# Patient Record
Sex: FEMALE | Race: WHITE | NOT HISPANIC OR LATINO
[De-identification: names, ages, dates, MRNs, and addresses within clinical notes are randomized per-mention and may not be internally consistent; named-entity substitution may affect disease eponyms.]

---

## 2019-03-30 ENCOUNTER — TRANSCRIPTION ENCOUNTER (OUTPATIENT)
Age: 44
End: 2019-03-30

## 2019-06-17 ENCOUNTER — TRANSCRIPTION ENCOUNTER (OUTPATIENT)
Age: 44
End: 2019-06-17

## 2022-09-02 ENCOUNTER — APPOINTMENT (OUTPATIENT)
Dept: ORTHOPEDIC SURGERY | Facility: CLINIC | Age: 47
End: 2022-09-02

## 2022-09-02 VITALS — WEIGHT: 155 LBS | BODY MASS INDEX: 30.43 KG/M2 | HEIGHT: 60 IN

## 2022-09-02 DIAGNOSIS — I10 ESSENTIAL (PRIMARY) HYPERTENSION: ICD-10-CM

## 2022-09-02 PROBLEM — Z00.00 ENCOUNTER FOR PREVENTIVE HEALTH EXAMINATION: Status: ACTIVE | Noted: 2022-09-02

## 2022-09-02 PROCEDURE — L1833: CPT

## 2022-09-02 PROCEDURE — 73562 X-RAY EXAM OF KNEE 3: CPT | Mod: RT

## 2022-09-02 PROCEDURE — J3490M: CUSTOM

## 2022-09-02 PROCEDURE — 99203 OFFICE O/P NEW LOW 30 MIN: CPT | Mod: 25

## 2022-09-02 PROCEDURE — 20610 DRAIN/INJ JOINT/BURSA W/O US: CPT | Mod: RT

## 2022-09-02 RX ORDER — VALSARTAN 40 MG/1
TABLET, COATED ORAL
Refills: 0 | Status: ACTIVE | COMMUNITY

## 2022-09-02 NOTE — ASSESSMENT
[FreeTextEntry1] : Xrays reviewed with patient\par Treatment options discussed \par HKB medically necessary for stability\par Injection done today, tolerated well\par Follow up with Dr. Jones in 2 weeks for further treatment \par \par Large joint corticosteroid injection given: Right KNee\par \par Patient indicated for injection after trial of rest, OTC medications including aspirin, Ibuprofen, Aleve etc or prescription NSAIDS, and/or exercises at home and/ or physical therapy without satisfactory response.  Patient has symptoms including pain, swelling, and/or decreased mobility in the joint. The risks, benefits, and alternatives to corticosteroid injection were explained in full to the patient, including but not limited to infection, sepsis, bleeding, scarring, skin discoloration, temporary increase in pain, syncopal episode, failure to resolve symptoms, allergic reaction, symptom recurrence, and elevation of blood sugar in diabetics. Patient understood the risks. All questions were answered. After discussion of options, patient requested an injection. \par \par Oral informed consent was obtained and sterile technique was utilized for the procedure including the preparation of the solutions used for the injection and betadine followed by alcohol prep to the injection site. Anesthesia was given with ethyl chloride sprayed topically. The injection was delivered. Patient tolerated the procedure well. \par \par Post Procedure Instructions: Patient was advised to call if redness, pain, or fever occur and apply ice for 15 min on and 15 min off later today\par \par Medications delivered: 2 cc celestone, 3 cc lidocaine, 3 cc marcaine

## 2022-09-02 NOTE — PHYSICAL EXAM
[NL (0)] : extension 0 degrees [5___] : hamstring 5[unfilled]/5 [Equivocal] : equivocal Mitchel [] : patient ambulates without assistive device [Right] : right knee [Degenerative change] : Degenerative change [TWNoteComboBox7] : flexion 90 degrees

## 2022-09-02 NOTE — HISTORY OF PRESENT ILLNESS
[8] : 8 [Radiating] : radiating [de-identified] : 9/2/22: Patient is a 46 yo female c/o right knee pain for 1 day after being on her feet all day at work. States her knee buckled on her and her pain increased. Taking motrin as needed for pain. Known hx of OA, [facility]. No previous surgeries to right knee.  [FreeTextEntry5] : pt c.o pain in rt knee  for 2 days states prior hx of arthritis\par

## 2022-09-04 ENCOUNTER — TRANSCRIPTION ENCOUNTER (OUTPATIENT)
Age: 47
End: 2022-09-04

## 2022-10-10 ENCOUNTER — APPOINTMENT (OUTPATIENT)
Dept: ORTHOPEDIC SURGERY | Facility: CLINIC | Age: 47
End: 2022-10-10

## 2022-10-10 VITALS — HEIGHT: 60 IN | BODY MASS INDEX: 30.43 KG/M2 | WEIGHT: 155 LBS

## 2022-10-10 PROCEDURE — 99204 OFFICE O/P NEW MOD 45 MIN: CPT

## 2022-10-10 NOTE — HISTORY OF PRESENT ILLNESS
[de-identified] : The patient is a 47 year old R hand dominant female who presents today c/o R knee pain. \par Date of Injury/Onset: ~4 weeks\par Pain:    At Rest: 5/10 \par With Activity:  8/10 \par Mechanism of injury: Insidious\par This is NOT a Work Related Injury being treated under Worker's Compensation.\par This is NOT an athletic injury occurring associated with an interscholastic or organized sports team.\par Quality of symptoms: Intense achiness, stiffness\par Improves with: CSI \par Worse with: Prolonged walking/standing/sitting \par Prior treatment: [facility]; CSI \par Prior Imaging: X-ray \par Out of work/sport: _, since _\par School/Sport/Position/Occupation: Teacher \par Additional Information: None\par

## 2022-10-10 NOTE — PHYSICAL EXAM
[Right] : right knee [All Views] : anteroposterior, lateral, skyline, and anteroposterior standing [There are no fractures, subluxations or dislocations. No significant abnormalities are seen] : There are no fractures, subluxations or dislocations. No significant abnormalities are seen [de-identified] : Neurologic: normal coordination, normal sensation, normal mood and affect, orientated and able to communicate.\par \par Skin: normal skin, no rash, no ulcers and no lesions.\par \par Lymphatic: no obvious lymphadenopathy in areas examined.\par \par Constitutional: well developed and well nourished.\par \par Cardiovascular: peripheral vascular exam is grossly normal.\par \par Pulmonary: no respiratory distress, lungs clear to auscultation bilaterally.\par \par Abdomen: normal bowel sounds, non-tender, no HSM and no mass.\par \par Examination of the right knee is as follows: \par Inspection: no effusion, no ecchymosis, no erythema, no atrophy, no deformities of quad tendon, no deformities of patellar tendon\par Palpation: medial joint line tenderness, ROM: full flexion and extension without pain 0-140. knee locking. Strength: quadriceps 5/5, hamstring 5/5. \par Testing: positive Chuck's \par Neuro: light touch is intact throughout.\par  [FreeTextEntry9] : 9/2/22 xrays right knee: PFOA.

## 2022-10-10 NOTE — DISCUSSION/SUMMARY
[de-identified] : The patient has tried  physical therapy, anti-inflammatories, rest, RICE, all with no relief. Let this note serve as a letter of medical necessity for MRI. They will have a right knee MRI to evaluate for MMT. They will follow up with me after test.\par \par OA packet provided. \par \par The patient has tried physical therapy, anti-inflammatories, rest, all with no relief. Let this note serve as a letter of medical necessity for authorization of hyaluronic acid injections. (Euflexxa)\par \par "Written by Storm Gunderson, acting as Scribe for Robb Jones M.D" \par \par Home Exercise \par \par  The patient is instructed on a home exercise program. \par  \par RICE \par I explained to the patient that rest, ice, compression, and elevation would benefit them.  They may return to activity after follow-up or when they no longer have any pain. \par  \par Pain Guide Activities \par The patient was advised to let pain guide the gradual advancement of activities. \par  \par Activity Modification \par The patient was advised to modify their activities. \par  \par Dx / Natural History \par The patient was advised of the diagnosis.  The natural history of the pathology was explained in full to the patient in layman's terms.  Several different treatment options were discussed and explained in full to the patient including the risks and benefits of both surgical and non-surgical treatments.  All questions and concerns were answered.\par

## 2022-10-18 ENCOUNTER — APPOINTMENT (OUTPATIENT)
Dept: ORTHOPEDIC SURGERY | Facility: CLINIC | Age: 47
End: 2022-10-18

## 2022-10-18 PROCEDURE — 20611 DRAIN/INJ JOINT/BURSA W/US: CPT

## 2022-10-18 PROCEDURE — 99214 OFFICE O/P EST MOD 30 MIN: CPT | Mod: 25

## 2022-10-18 NOTE — HISTORY OF PRESENT ILLNESS
[de-identified] : The patient is a 47 year old R hand dominant female who presents today c/o R knee pain. \par Date of Injury/Onset: ~4 weeks\par Pain: At Rest: 5/10 \par With Activity: 8/10 \par Mechanism of injury: Insidious\par This is NOT a Work Related Injury being treated under Worker's Compensation.\par This is NOT an athletic injury occurring associated with an interscholastic or organized sports team.\par Quality of symptoms: Intense achiness, stiffness\par Improves with: CSI \par Worse with: Prolonged walking/standing/sitting \par Prior treatment: [facility]; CSI \par Prior Imaging: X-ray \par Out of work/sport: _, since _\par School/Sport/Position/Occupation: Teacher \par Additional Information: Starting her series for injections today\par  \par

## 2022-10-18 NOTE — PHYSICAL EXAM
[Right] : right knee [All Views] : anteroposterior, lateral, skyline, and anteroposterior standing [There are no fractures, subluxations or dislocations. No significant abnormalities are seen] : There are no fractures, subluxations or dislocations. No significant abnormalities are seen [de-identified] : Neurologic: normal coordination, normal sensation, normal mood and affect, orientated and able to communicate.\par \par Skin: normal skin, no rash, no ulcers and no lesions.\par \par Lymphatic: no obvious lymphadenopathy in areas examined.\par \par Constitutional: well developed and well nourished.\par \par Cardiovascular: peripheral vascular exam is grossly normal.\par \par Pulmonary: no respiratory distress, lungs clear to auscultation bilaterally.\par \par Abdomen: normal bowel sounds, non-tender, no HSM and no mass.\par \par Examination of the right knee is as follows: \par Inspection: no effusion, no ecchymosis, no erythema, no atrophy, no deformities of quad tendon, no deformities of patellar tendon\par Palpation: medial joint line tenderness, ROM: full flexion and extension without pain 0-140. knee locking. Strength: quadriceps 5/5, hamstring 5/5. \par Testing: positive Chuck's \par Neuro: light touch is intact throughout.\par  [FreeTextEntry9] : 9/2/22 xrays right knee: PFOA.

## 2022-10-18 NOTE — DISCUSSION/SUMMARY
[de-identified] : RB&A to hyaluronic acid injection discussed.\par All questions were answered.\par Patient wishes to move forward with injection today.\par Fu 1 week Euflexxa #2 rt knee \par \par RIGHT KNEE EUFLEXXA - Ultrasound Guided #1 of 3\par Patient Identification\par Name/: Verbal with patient and/or family\par Procedure Verification:\par Procedure confirmed with patient or family/designee\par Consent for procedure: Verbal Consent Given\par Relevant documentation completed, reviewed, and signed\par Clinical indications for procedure confirmed\par Time-out with all members of procedure team immediately prior to procedure:\par Correct patient identified. Agreement on procedure. Correct side and site.\par \par KNEE INTRAARTICULAR INJECTION - RIGHT\par After verbal consent and identification of the correct patient and correct site, the RIGHT knee were prepped using alcohol swabs and betadine. This was allowed time to air dry. The pre-loaded Euflexxa was injected into the RIGHT knee via the lateral knee portal with a 1 inch 22G needle. Ultrasound was used to ensure proper needle placement. The patient tolerated the procedure well. A sterile dressing was placed. After-care instructions were provided and included instructions to ice the area and to call if redness, pain, or fever develop.

## 2022-10-21 ENCOUNTER — FORM ENCOUNTER (OUTPATIENT)
Age: 47
End: 2022-10-21

## 2022-10-22 ENCOUNTER — APPOINTMENT (OUTPATIENT)
Dept: MRI IMAGING | Facility: CLINIC | Age: 47
End: 2022-10-22

## 2022-10-22 PROCEDURE — 73721 MRI JNT OF LWR EXTRE W/O DYE: CPT | Mod: RT

## 2022-10-25 ENCOUNTER — APPOINTMENT (OUTPATIENT)
Dept: ORTHOPEDIC SURGERY | Facility: CLINIC | Age: 47
End: 2022-10-25

## 2022-10-25 PROCEDURE — 99213 OFFICE O/P EST LOW 20 MIN: CPT | Mod: 25

## 2022-10-25 PROCEDURE — 20611 DRAIN/INJ JOINT/BURSA W/US: CPT

## 2022-10-25 NOTE — HISTORY OF PRESENT ILLNESS
[de-identified] : The patient is a 47 year old R hand dominant female who presents today c/o R knee pain. \par Date of Injury/Onset: ~4 weeks\par Pain: At Rest: 5/10 \par With Activity: 8/10 \par Mechanism of injury: Insidious\par This is NOT a Work Related Injury being treated under Worker's Compensation.\par This is NOT an athletic injury occurring associated with an interscholastic or organized sports team.\par Quality of symptoms: Intense achiness, stiffness\par Improves with: CSI \par Worse with: Prolonged walking/standing/sitting \par Prior treatment: [facility]; CSI \par Prior Imaging: X-ray \par Out of work/sport: _, since _\par School/Sport/Position/Occupation: Teacher \par Additional Information: Euflexxa series continued today \par  \par

## 2022-10-25 NOTE — DISCUSSION/SUMMARY
[de-identified] : RB&A to hyaluronic acid injection discussed.\par All questions were answered.\par Patient wishes to move forward with injection today.\par Fu 1 week Euflexxa #3 rt knee \par \par RIGHT KNEE EUFLEXXA - Ultrasound Guided #2 of 3\par Patient Identification\par Name/: Verbal with patient and/or family\par Procedure Verification:\par Procedure confirmed with patient or family/designee\par Consent for procedure: Verbal Consent Given\par Relevant documentation completed, reviewed, and signed\par Clinical indications for procedure confirmed\par Time-out with all members of procedure team immediately prior to procedure:\par Correct patient identified. Agreement on procedure. Correct side and site.\par \par KNEE INTRAARTICULAR INJECTION - RIGHT\par After verbal consent and identification of the correct patient and correct site, the RIGHT knee were prepped using alcohol swabs and betadine. This was allowed time to air dry. The pre-loaded Euflexxa was injected into the RIGHT knee via the lateral knee portal with a 1 inch 22G needle. Ultrasound was used to ensure proper needle placement. The patient tolerated the procedure well. A sterile dressing was placed. After-care instructions were provided and included instructions to ice the area and to call if redness, pain, or fever develop.

## 2022-10-25 NOTE — PHYSICAL EXAM
[Right] : right knee [All Views] : anteroposterior, lateral, skyline, and anteroposterior standing [There are no fractures, subluxations or dislocations. No significant abnormalities are seen] : There are no fractures, subluxations or dislocations. No significant abnormalities are seen [de-identified] : Neurologic: normal coordination, normal sensation, normal mood and affect, orientated and able to communicate.\par \par Skin: normal skin, no rash, no ulcers and no lesions.\par \par Lymphatic: no obvious lymphadenopathy in areas examined.\par \par Constitutional: well developed and well nourished.\par \par Cardiovascular: peripheral vascular exam is grossly normal.\par \par Pulmonary: no respiratory distress, lungs clear to auscultation bilaterally.\par \par Abdomen: normal bowel sounds, non-tender, no HSM and no mass.\par \par Examination of the right knee is as follows: \par Inspection: no effusion, no ecchymosis, no erythema, no atrophy, no deformities of quad tendon, no deformities of patellar tendon\par Palpation: medial joint line tenderness, ROM: full flexion and extension without pain 0-140. knee locking. Strength: quadriceps 5/5, hamstring 5/5. \par Testing: positive Chuck's \par Neuro: light touch is intact throughout.\par  [FreeTextEntry9] : 9/2/22 xrays right knee: PFOA.

## 2022-10-28 ENCOUNTER — APPOINTMENT (OUTPATIENT)
Dept: ORTHOPEDIC SURGERY | Facility: CLINIC | Age: 47
End: 2022-10-28

## 2022-11-01 ENCOUNTER — APPOINTMENT (OUTPATIENT)
Dept: ORTHOPEDIC SURGERY | Facility: CLINIC | Age: 47
End: 2022-11-01

## 2022-11-01 VITALS — BODY MASS INDEX: 30.43 KG/M2 | WEIGHT: 155 LBS | HEIGHT: 60 IN

## 2022-11-01 PROCEDURE — 20611 DRAIN/INJ JOINT/BURSA W/US: CPT

## 2022-11-01 PROCEDURE — 99213 OFFICE O/P EST LOW 20 MIN: CPT | Mod: 25

## 2022-11-01 NOTE — PHYSICAL EXAM
[Right] : right knee [All Views] : anteroposterior, lateral, skyline, and anteroposterior standing [There are no fractures, subluxations or dislocations. No significant abnormalities are seen] : There are no fractures, subluxations or dislocations. No significant abnormalities are seen [de-identified] : Neurologic: normal coordination, normal sensation, normal mood and affect, orientated and able to communicate.\par \par Skin: normal skin, no rash, no ulcers and no lesions.\par \par Lymphatic: no obvious lymphadenopathy in areas examined.\par \par Constitutional: well developed and well nourished.\par \par Cardiovascular: peripheral vascular exam is grossly normal.\par \par Pulmonary: no respiratory distress, lungs clear to auscultation bilaterally.\par \par Abdomen: normal bowel sounds, non-tender, no HSM and no mass.\par \par Examination of the right knee is as follows: \par Inspection: no effusion, no ecchymosis, no erythema, no atrophy, no deformities of quad tendon, no deformities of patellar tendon\par Palpation: medial joint line tenderness, ROM: full flexion and extension without pain 0-140. knee locking. Strength: quadriceps 5/5, hamstring 5/5. \par Testing: positive Chuck's \par Neuro: light touch is intact throughout.\par  [FreeTextEntry9] : 9/2/22 xrays right knee: PFOA.

## 2022-11-01 NOTE — DISCUSSION/SUMMARY
[de-identified] : RB&A to hyaluronic acid injection discussed.\par All questions were answered.\par Patient wishes to move forward with injection today.\par Follow up as needed\par \par RIGHT KNEE EUFLEXXA - Ultrasound Guided #3 of 3\par Patient Identification\par Name/: Verbal with patient and/or family\par Procedure Verification:\par Procedure confirmed with patient or family/designee\par Consent for procedure: Verbal Consent Given\par Relevant documentation completed, reviewed, and signed\par Clinical indications for procedure confirmed\par Time-out with all members of procedure team immediately prior to procedure:\par Correct patient identified. Agreement on procedure. Correct side and site.\par \par KNEE INTRAARTICULAR INJECTION - RIGHT - ULTRASOUND GUIDANCE \par After verbal consent and identification of the correct patient and correct site, the RIGHT knee were prepped using alcohol swabs and betadine. This was allowed time to air dry. The pre-loaded Euflexxa was injected into the RIGHT knee via the lateral knee portal with a 1 inch 22G needle. Ultrasound was used to ensure proper needle placement. The patient tolerated the procedure well. A sterile dressing was placed. After-care instructions were provided and included instructions to ice the area and to call if redness, pain, or fever develop.

## 2022-11-01 NOTE — HISTORY OF PRESENT ILLNESS
[de-identified] : The patient is a 47 year old R hand dominant female who presents today c/o R knee pain. \par Date of Injury/Onset:\par Pain: At Rest: 5/10 \par With Activity: 8/10 \par Mechanism of injury: Insidious\par This is NOT a Work Related Injury being treated under Worker's Compensation.\par This is NOT an athletic injury occurring associated with an interscholastic or organized sports team.\par Quality of symptoms: Intense achiness, stiffness\par Improves with: CSI \par Worse with: Prolonged walking/standing/sitting \par Prior treatment: [facility]; CSI \par Prior Imaging: X-ray \par Out of work/sport: _, since _\par School/Sport/Position/Occupation: Teacher \par Additional Information: Euflexxa series continued today \par

## 2023-01-21 ENCOUNTER — APPOINTMENT (OUTPATIENT)
Dept: ORTHOPEDIC SURGERY | Facility: CLINIC | Age: 48
End: 2023-01-21
Payer: COMMERCIAL

## 2023-01-21 VITALS — WEIGHT: 155 LBS | HEIGHT: 60 IN | BODY MASS INDEX: 30.43 KG/M2

## 2023-01-21 PROCEDURE — 73564 X-RAY EXAM KNEE 4 OR MORE: CPT | Mod: LT

## 2023-02-14 ENCOUNTER — APPOINTMENT (OUTPATIENT)
Dept: ORTHOPEDIC SURGERY | Facility: CLINIC | Age: 48
End: 2023-02-14

## 2023-03-21 ENCOUNTER — APPOINTMENT (OUTPATIENT)
Dept: ORTHOPEDIC SURGERY | Facility: CLINIC | Age: 48
End: 2023-03-21
Payer: COMMERCIAL

## 2023-03-21 VITALS — WEIGHT: 155 LBS | HEIGHT: 60 IN | BODY MASS INDEX: 30.43 KG/M2

## 2023-03-21 DIAGNOSIS — Z78.9 OTHER SPECIFIED HEALTH STATUS: ICD-10-CM

## 2023-03-21 PROCEDURE — 99214 OFFICE O/P EST MOD 30 MIN: CPT

## 2023-03-21 NOTE — DATA REVIEWED
[FreeTextEntry1] : 01/21/23\par X-ray of the LEFT KNEE - 4 views - [facility]\par PFOA \par mild medial OA\par

## 2023-03-21 NOTE — HISTORY OF PRESENT ILLNESS
[de-identified] : The patient is a 48 year old R hand dominant F who presents today complaining of Left knee.   \par Date of Injury/Onset: December 2022\par Pain:    At Rest: 4/10  \par With Activity:  7/10  \par Mechanism of injury: No specific injury, hx of OA\par This is not a Work Related Injury being treated under Worker's Compensation. \par This is not an athletic injury occurring associated with an interscholastic or organized sports team. \par Quality of symptoms: stiffness, aching\par Improves with: ice, elevation, NSAIDS, knee brace\par Worse with: activity\par Prior treatment: CSI 1/21/23, sxs came back after 6 weeks \par Prior Imaging: XR at [facility] \par Out of work/sport: _, since _ \par School/Sport/Position/Occupation:full time teacher\par Additional Information: has done CSI and euflexxa injections on the opposite knee which helped sxs.\par has hx of OA and meniscus repair on the left knee.  \par

## 2023-03-21 NOTE — PHYSICAL EXAM
[Right] : right knee [All Views] : anteroposterior, lateral, skyline, and anteroposterior standing [There are no fractures, subluxations or dislocations. No significant abnormalities are seen] : There are no fractures, subluxations or dislocations. No significant abnormalities are seen [de-identified] : Left Knee:\par Full ROM \par crepitus \par nontender\par  [FreeTextEntry9] : 9/2/22 xrays right knee: PFOA.

## 2023-03-21 NOTE — DISCUSSION/SUMMARY
[de-identified] : RB&A to hyaluronic acid injection discussed.\par All questions were answered.\par \par Patient has tried physical therapy, anti-inflammatories, rest, with no improvement. Let this note serve as a letter of medical necessity for authorization of hyaluronic acid visco injection(s). \par \par -----------------------------------------------\par Home Exercise\par The patient is instructed on a home exercise program.\par \par YAS ABRAHAM Acting as a Scribe for Dr. Jones\par I, Yas Abraham, attest that this documentation has been prepared under the direction and in the presence of Provider Robb Jones MD.\par \par Activity Modification\par The patient was advised to modify their activities.\par \par Dx / Natural History\par The patient was advised of the diagnosis.  The natural history of the pathology was explained in full to the patient in layman's terms.  Several different treatment options were discussed and explained in full to the patient including the risks and benefits of both surgical and non-surgical treatments.  All questions and concerns were answered.\par \par Pain Guide Activities\par The patient was advised to let pain guide the gradual advancement of activities.\par \par RICE\par I explained to the patient that rest, ice, compression, and elevation would benefit them.  They may return to activity after follow-up or when they no longer have any pain. \par

## 2023-03-31 ENCOUNTER — APPOINTMENT (OUTPATIENT)
Dept: ORTHOPEDIC SURGERY | Facility: CLINIC | Age: 48
End: 2023-03-31
Payer: COMMERCIAL

## 2023-03-31 VITALS — BODY MASS INDEX: 30.43 KG/M2 | HEIGHT: 60 IN | WEIGHT: 155 LBS

## 2023-03-31 PROCEDURE — 20611 DRAIN/INJ JOINT/BURSA W/US: CPT

## 2023-03-31 PROCEDURE — 99214 OFFICE O/P EST MOD 30 MIN: CPT | Mod: 25

## 2023-03-31 NOTE — PHYSICAL EXAM
[Right] : right knee [All Views] : anteroposterior, lateral, skyline, and anteroposterior standing [There are no fractures, subluxations or dislocations. No significant abnormalities are seen] : There are no fractures, subluxations or dislocations. No significant abnormalities are seen [de-identified] : Left Knee:\par Full ROM \par crepitus \par nontender\par  [FreeTextEntry9] : 9/2/22 xrays right knee: PFOA.

## 2023-03-31 NOTE — DISCUSSION/SUMMARY
[de-identified] : RB&A to hyaluronic acid injection discussed.\par All questions were answered.\par Euflexxa #1 of 3 injection series in the left knee was provided today.\par Follow up one week to receive #2 of 3 injection series.\par \par LEFT KNEE EUFLEXXA INJECTION - ULTRASOUND GUIDED \par Patient Identification\par Name/: Verbal with patient and/or family\par Procedure Verification:\par Procedure confirmed with patient or family/designee\par Consent for procedure: Verbal Consent Given\par Relevant documentation completed, reviewed, and signed\par Clinical indications for procedure confirmed\par Time-out with all members of procedure team immediately prior to procedure:\par Correct patient identified. Agreement on procedure. Correct side and site.\par \par KNEE INTRAARTICULAR INJECTION - LEFT \par After verbal consent and identification of the correct patient and correct site, the LEFT knee were prepped using alcohol swabs and betadine. This was allowed time to air dry. The pre-loaded EUFLEXXA was injected into the LEFT knee via the lateral knee portal with a 1 inch 22G needle. Ultrasound was used to ensure proper needle placement. The patient tolerated the procedure well. A sterile dressing was placed. After-care instructions were provided and included instructions to ice the area and to call if redness, pain, or fever develop. \par \par -----------------------------------------------\par Home Exercise\par The patient is instructed on a home exercise program.\par \par YAS ABRAHAM Acting as a Scribe for Dr. Jones\par I, Yas Abraham, attest that this documentation has been prepared under the direction and in the presence of Provider Robb Jones MD.\par \par Activity Modification\par The patient was advised to modify their activities.\par \par Dx / Natural History\par The patient was advised of the diagnosis.  The natural history of the pathology was explained in full to the patient in layman's terms.  Several different treatment options were discussed and explained in full to the patient including the risks and benefits of both surgical and non-surgical treatments.  All questions and concerns were answered.\par \par Pain Guide Activities\par The patient was advised to let pain guide the gradual advancement of activities.\par \par RICE\par I explained to the patient that rest, ice, compression, and elevation would benefit them.  They may return to activity after follow-up or when they no longer have any pain. \par

## 2023-04-11 ENCOUNTER — APPOINTMENT (OUTPATIENT)
Dept: ORTHOPEDIC SURGERY | Facility: CLINIC | Age: 48
End: 2023-04-11
Payer: COMMERCIAL

## 2023-04-11 VITALS — WEIGHT: 155 LBS | BODY MASS INDEX: 30.43 KG/M2 | HEIGHT: 60 IN

## 2023-04-11 PROCEDURE — 20611 DRAIN/INJ JOINT/BURSA W/US: CPT

## 2023-04-11 PROCEDURE — 99213 OFFICE O/P EST LOW 20 MIN: CPT | Mod: 25

## 2023-04-11 NOTE — PHYSICAL EXAM
[Right] : right knee [All Views] : anteroposterior, lateral, skyline, and anteroposterior standing [There are no fractures, subluxations or dislocations. No significant abnormalities are seen] : There are no fractures, subluxations or dislocations. No significant abnormalities are seen [de-identified] : Left Knee:\par Full ROM \par crepitus \par nontender\par  [FreeTextEntry9] : 9/2/22 xrays right knee: PFOA.

## 2023-04-11 NOTE — DISCUSSION/SUMMARY
[de-identified] : RB&A to hyaluronic acid injection discussed.\par All questions were answered.\par Euflexxa #2 of 3 injection series in the left knee was provided today.\par Follow up one week to receive #3 of 3 injection series.\par \par LEFT KNEE EUFLEXXA INJECTION - ULTRASOUND GUIDED \par Patient Identification\par Name/: Verbal with patient and/or family\par Procedure Verification:\par Procedure confirmed with patient or family/designee\par Consent for procedure: Verbal Consent Given\par Relevant documentation completed, reviewed, and signed\par Clinical indications for procedure confirmed\par Time-out with all members of procedure team immediately prior to procedure:\par Correct patient identified. Agreement on procedure. Correct side and site.\par \par KNEE INTRAARTICULAR INJECTION - LEFT \par After verbal consent and identification of the correct patient and correct site, the LEFT knee were prepped using alcohol swabs and betadine. This was allowed time to air dry. The pre-loaded EUFLEXXA was injected into the LEFT knee via the lateral knee portal with a 1 inch 22G needle. Ultrasound was used to ensure proper needle placement. The patient tolerated the procedure well. A sterile dressing was placed. After-care instructions were provided and included instructions to ice the area and to call if redness, pain, or fever develop. \par \par -----------------------------------------------\par Home Exercise\par The patient is instructed on a home exercise program.\par \par YAS ABRAHAM Acting as a Scribe for Dr. Jones\par I, Yas Abraham, attest that this documentation has been prepared under the direction and in the presence of Provider Robb Jones MD.\par \par Activity Modification\par The patient was advised to modify their activities.\par \par Dx / Natural History\par The patient was advised of the diagnosis.  The natural history of the pathology was explained in full to the patient in layman's terms.  Several different treatment options were discussed and explained in full to the patient including the risks and benefits of both surgical and non-surgical treatments.  All questions and concerns were answered.\par \par Pain Guide Activities\par The patient was advised to let pain guide the gradual advancement of activities.\par \par RICE\par I explained to the patient that rest, ice, compression, and elevation would benefit them.  They may return to activity after follow-up or when they no longer have any pain. \par

## 2023-04-18 ENCOUNTER — APPOINTMENT (OUTPATIENT)
Dept: ORTHOPEDIC SURGERY | Facility: CLINIC | Age: 48
End: 2023-04-18

## 2023-04-25 ENCOUNTER — APPOINTMENT (OUTPATIENT)
Dept: ORTHOPEDIC SURGERY | Facility: CLINIC | Age: 48
End: 2023-04-25
Payer: COMMERCIAL

## 2023-04-25 VITALS — BODY MASS INDEX: 30.43 KG/M2 | HEIGHT: 60 IN | WEIGHT: 155 LBS

## 2023-04-25 DIAGNOSIS — M25.562 PAIN IN LEFT KNEE: ICD-10-CM

## 2023-04-25 DIAGNOSIS — M17.11 UNILATERAL PRIMARY OSTEOARTHRITIS, RIGHT KNEE: ICD-10-CM

## 2023-04-25 PROCEDURE — 99213 OFFICE O/P EST LOW 20 MIN: CPT | Mod: 25

## 2023-04-25 PROCEDURE — 20611 DRAIN/INJ JOINT/BURSA W/US: CPT

## 2023-04-26 NOTE — DISCUSSION/SUMMARY
[de-identified] : RB&A to hyaluronic acid injection discussed.\par All questions were answered.\par Physical therapy prescribed for strengthening and stretching. \par Euflexxa #3 of 3 injection series in the left knee was provided today.\par Follow up as needed \par \par LEFT KNEE EUFLEXXA INJECTION - ULTRASOUND GUIDED \par Patient Identification\par Name/: Verbal with patient and/or family\par Procedure Verification:\par Procedure confirmed with patient or family/designee\par Consent for procedure: Verbal Consent Given\par Relevant documentation completed, reviewed, and signed\par Clinical indications for procedure confirmed\par Time-out with all members of procedure team immediately prior to procedure:\par Correct patient identified. Agreement on procedure. Correct side and site.\par \par KNEE INTRAARTICULAR INJECTION - LEFT \par After verbal consent and identification of the correct patient and correct site, the LEFT knee were prepped using alcohol swabs and betadine. This was allowed time to air dry. The pre-loaded EUFLEXXA was injected into the LEFT knee via the lateral knee portal with a 1 inch 22G needle. Ultrasound was used to ensure proper needle placement. The patient tolerated the procedure well. A sterile dressing was placed. After-care instructions were provided and included instructions to ice the area and to call if redness, pain, or fever develop. \par \par -----------------------------------------------\par Home Exercise\par The patient is instructed on a home exercise program.\par \par YAS ABRAHAM Acting as a Scribe for Dr. Jones\par I, Yas Abraham, attest that this documentation has been prepared under the direction and in the presence of Provider Robb Jones MD.\par \par Activity Modification\par The patient was advised to modify their activities.\par \par Dx / Natural History\par The patient was advised of the diagnosis.  The natural history of the pathology was explained in full to the patient in layman's terms.  Several different treatment options were discussed and explained in full to the patient including the risks and benefits of both surgical and non-surgical treatments.  All questions and concerns were answered.\par \par Pain Guide Activities\par The patient was advised to let pain guide the gradual advancement of activities.\par \par RICE\par I explained to the patient that rest, ice, compression, and elevation would benefit them.  They may return to activity after follow-up or when they no longer have any pain. \par

## 2023-04-26 NOTE — PHYSICAL EXAM
[Right] : right knee [All Views] : anteroposterior, lateral, skyline, and anteroposterior standing [There are no fractures, subluxations or dislocations. No significant abnormalities are seen] : There are no fractures, subluxations or dislocations. No significant abnormalities are seen [de-identified] : Left Knee:\par Full ROM \par crepitus \par nontender\par  [FreeTextEntry9] : 9/2/22 xrays right knee: PFOA.

## 2023-05-03 ENCOUNTER — NON-APPOINTMENT (OUTPATIENT)
Age: 48
End: 2023-05-03

## 2023-06-07 NOTE — HISTORY OF PRESENT ILLNESS
[8] : 8 [Dull/Aching] : dull/aching [Sharp] : sharp [Intermittent] : intermittent [Nothing helps with pain getting better] : Nothing helps with pain getting better [de-identified] : 47 year old F, patient of Dr. 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  Pain improves at rest. Aggravated with prolonged standing and bending/sitting.  Start up pain.  She has not been able to exercise in 4 weeks.  No locking, clicking or giving way episodes.  Hx L knee scope ~7 years ago for meniscal tear which was helpful. No L knee injections.  For her R knee issue, cortisone was helpful. No injury recalled. \par \par pmh: HTN\par \par work:  [employer]  [] : no [FreeTextEntry1] : LT Knee [FreeTextEntry5] : Pt has HX of OA and Pt would like a CSI today, Last CSI was in October. Pt has been getting treatment for her Oa in the past by Dr Jones. Her last Euflexxa injections were finished November

## 2023-11-02 ENCOUNTER — NON-APPOINTMENT (OUTPATIENT)
Age: 48
End: 2023-11-02

## 2024-07-26 ENCOUNTER — OFFICE (OUTPATIENT)
Dept: URBAN - METROPOLITAN AREA CLINIC 115 | Facility: CLINIC | Age: 49
Setting detail: OPHTHALMOLOGY
End: 2024-07-26
Payer: COMMERCIAL

## 2024-07-26 DIAGNOSIS — B30.9: ICD-10-CM

## 2024-07-26 PROCEDURE — 92002 INTRM OPH EXAM NEW PATIENT: CPT | Performed by: OPHTHALMOLOGY

## 2024-07-26 ASSESSMENT — CONFRONTATIONAL VISUAL FIELD TEST (CVF)
OS_FINDINGS: FULL
OD_FINDINGS: FULL

## 2024-12-13 ENCOUNTER — NON-APPOINTMENT (OUTPATIENT)
Age: 49
End: 2024-12-13

## 2025-03-11 ENCOUNTER — APPOINTMENT (OUTPATIENT)
Dept: ORTHOPEDIC SURGERY | Facility: CLINIC | Age: 50
End: 2025-03-11

## 2025-03-18 ENCOUNTER — APPOINTMENT (OUTPATIENT)
Dept: ORTHOPEDIC SURGERY | Facility: CLINIC | Age: 50
End: 2025-03-18

## 2025-03-18 DIAGNOSIS — Z78.9 OTHER SPECIFIED HEALTH STATUS: ICD-10-CM

## 2025-03-18 DIAGNOSIS — M17.12 UNILATERAL PRIMARY OSTEOARTHRITIS, LEFT KNEE: ICD-10-CM

## 2025-03-18 PROBLEM — M79.18 MUSCULOSKELETAL PAIN: Status: ACTIVE | Noted: 2025-03-18

## 2025-03-18 PROCEDURE — 99214 OFFICE O/P EST MOD 30 MIN: CPT | Mod: 25

## 2025-03-18 PROCEDURE — 73564 X-RAY EXAM KNEE 4 OR MORE: CPT | Mod: 50

## 2025-03-25 ENCOUNTER — APPOINTMENT (OUTPATIENT)
Dept: ORTHOPEDIC SURGERY | Facility: CLINIC | Age: 50
End: 2025-03-25

## 2025-03-25 DIAGNOSIS — M79.18 MYALGIA, OTHER SITE: ICD-10-CM

## 2025-03-25 PROCEDURE — 20611 DRAIN/INJ JOINT/BURSA W/US: CPT | Mod: 50

## 2025-04-01 ENCOUNTER — APPOINTMENT (OUTPATIENT)
Dept: ORTHOPEDIC SURGERY | Facility: CLINIC | Age: 50
End: 2025-04-01

## 2025-04-01 DIAGNOSIS — M17.12 UNILATERAL PRIMARY OSTEOARTHRITIS, LEFT KNEE: ICD-10-CM

## 2025-04-01 DIAGNOSIS — G89.29 PAIN IN RIGHT KNEE: ICD-10-CM

## 2025-04-01 DIAGNOSIS — M25.561 PAIN IN RIGHT KNEE: ICD-10-CM

## 2025-04-01 PROCEDURE — 20611 DRAIN/INJ JOINT/BURSA W/US: CPT | Mod: 50

## 2025-04-08 ENCOUNTER — APPOINTMENT (OUTPATIENT)
Dept: ORTHOPEDIC SURGERY | Facility: CLINIC | Age: 50
End: 2025-04-08

## 2025-04-08 DIAGNOSIS — M79.18 MYALGIA, OTHER SITE: ICD-10-CM

## 2025-04-08 DIAGNOSIS — M25.562 PAIN IN LEFT KNEE: ICD-10-CM

## 2025-04-08 DIAGNOSIS — M17.11 UNILATERAL PRIMARY OSTEOARTHRITIS, RIGHT KNEE: ICD-10-CM

## 2025-04-08 PROCEDURE — 20611 DRAIN/INJ JOINT/BURSA W/US: CPT | Mod: 50

## 2025-05-20 ENCOUNTER — APPOINTMENT (OUTPATIENT)
Dept: ORTHOPEDIC SURGERY | Facility: CLINIC | Age: 50
End: 2025-05-20

## 2025-05-20 VITALS — HEIGHT: 60 IN | WEIGHT: 166 LBS | BODY MASS INDEX: 32.59 KG/M2

## 2025-05-20 DIAGNOSIS — M70.51 OTHER BURSITIS OF KNEE, RIGHT KNEE: ICD-10-CM

## 2025-05-20 PROCEDURE — 99214 OFFICE O/P EST MOD 30 MIN: CPT

## 2025-05-20 RX ORDER — METHYLPREDNISOLONE 4 MG/1
4 TABLET ORAL
Qty: 1 | Refills: 0 | Status: ACTIVE | COMMUNITY
Start: 2025-05-20 | End: 1900-01-01

## 2025-05-20 RX ORDER — DICLOFENAC SODIUM 10 MG/G
1 GEL TOPICAL 4 TIMES DAILY
Qty: 100 | Refills: 1 | Status: ACTIVE | COMMUNITY
Start: 2025-05-20 | End: 1900-01-01

## 2025-06-13 ENCOUNTER — APPOINTMENT (OUTPATIENT)
Dept: ORTHOPEDIC SURGERY | Facility: CLINIC | Age: 50
End: 2025-06-13

## 2025-06-13 PROCEDURE — 99214 OFFICE O/P EST MOD 30 MIN: CPT

## 2025-06-13 RX ORDER — MELOXICAM 15 MG/1
15 TABLET ORAL
Qty: 30 | Refills: 2 | Status: ACTIVE | COMMUNITY
Start: 2025-06-13 | End: 1900-01-01

## 2025-08-20 ENCOUNTER — RX ONLY (RX ONLY)
Age: 50
End: 2025-08-20

## 2025-08-20 ENCOUNTER — OFFICE (OUTPATIENT)
Dept: URBAN - METROPOLITAN AREA CLINIC 113 | Facility: CLINIC | Age: 50
Setting detail: OPHTHALMOLOGY
End: 2025-08-20
Payer: COMMERCIAL

## 2025-08-20 DIAGNOSIS — H16.221: ICD-10-CM

## 2025-08-20 PROCEDURE — 99213 OFFICE O/P EST LOW 20 MIN: CPT | Performed by: STUDENT IN AN ORGANIZED HEALTH CARE EDUCATION/TRAINING PROGRAM

## 2025-08-20 ASSESSMENT — CONFRONTATIONAL VISUAL FIELD TEST (CVF)
OD_FINDINGS: FULL
OS_FINDINGS: FULL

## 2025-08-20 ASSESSMENT — DRY EYES - PHYSICIAN NOTES: OD_GENERALCOMMENTS: CENTRAL

## 2025-08-20 ASSESSMENT — SUPERFICIAL PUNCTATE KERATITIS (SPK): OD_SPK: 4+

## 2025-08-20 ASSESSMENT — VISUAL ACUITY
OS_BCVA: 20/40
OD_BCVA: 20/20-

## 2025-09-10 ENCOUNTER — RX RENEWAL (OUTPATIENT)
Age: 50
End: 2025-09-10